# Patient Record
Sex: MALE | Race: OTHER | URBAN - METROPOLITAN AREA
[De-identification: names, ages, dates, MRNs, and addresses within clinical notes are randomized per-mention and may not be internally consistent; named-entity substitution may affect disease eponyms.]

---

## 2023-12-15 ENCOUNTER — APPOINTMENT (RX ONLY)
Dept: URBAN - METROPOLITAN AREA CLINIC 166 | Facility: CLINIC | Age: 53
Setting detail: DERMATOLOGY
End: 2023-12-15

## 2023-12-15 DIAGNOSIS — L57.0 ACTINIC KERATOSIS: ICD-10-CM

## 2023-12-15 DIAGNOSIS — D22 MELANOCYTIC NEVI: ICD-10-CM

## 2023-12-15 DIAGNOSIS — D18.0 HEMANGIOMA: ICD-10-CM

## 2023-12-15 DIAGNOSIS — L81.4 OTHER MELANIN HYPERPIGMENTATION: ICD-10-CM

## 2023-12-15 DIAGNOSIS — D49.2 NEOPLASM OF UNSPECIFIED BEHAVIOR OF BONE, SOFT TISSUE, AND SKIN: ICD-10-CM

## 2023-12-15 DIAGNOSIS — Z71.89 OTHER SPECIFIED COUNSELING: ICD-10-CM

## 2023-12-15 DIAGNOSIS — L57.8 OTHER SKIN CHANGES DUE TO CHRONIC EXPOSURE TO NONIONIZING RADIATION: ICD-10-CM

## 2023-12-15 DIAGNOSIS — L82.1 OTHER SEBORRHEIC KERATOSIS: ICD-10-CM

## 2023-12-15 PROBLEM — D18.01 HEMANGIOMA OF SKIN AND SUBCUTANEOUS TISSUE: Status: ACTIVE | Noted: 2023-12-15

## 2023-12-15 PROBLEM — D22.5 MELANOCYTIC NEVI OF TRUNK: Status: ACTIVE | Noted: 2023-12-15

## 2023-12-15 PROCEDURE — 11102 TANGNTL BX SKIN SINGLE LES: CPT

## 2023-12-15 PROCEDURE — ? LIQUID NITROGEN

## 2023-12-15 PROCEDURE — ? BIOPSY BY SHAVE METHOD

## 2023-12-15 PROCEDURE — ? COUNSELING

## 2023-12-15 PROCEDURE — 99213 OFFICE O/P EST LOW 20 MIN: CPT | Mod: 25

## 2023-12-15 PROCEDURE — ? SUNSCREEN RECOMMENDATIONS

## 2023-12-15 PROCEDURE — 17000 DESTRUCT PREMALG LESION: CPT | Mod: 59

## 2023-12-15 ASSESSMENT — LOCATION DETAILED DESCRIPTION DERM
LOCATION DETAILED: LEFT SUPERIOR HELIX
LOCATION DETAILED: SUPERIOR LUMBAR SPINE
LOCATION DETAILED: LEFT SUPERIOR UPPER BACK
LOCATION DETAILED: RIGHT POSTERIOR SHOULDER
LOCATION DETAILED: LEFT VENTRAL PROXIMAL FOREARM
LOCATION DETAILED: LEFT ANTERIOR SHOULDER
LOCATION DETAILED: LEFT SUPERIOR POSTERIOR HELIX
LOCATION DETAILED: RIGHT INFERIOR POSTERIOR HELIX
LOCATION DETAILED: LEFT PROXIMAL DORSAL FOREARM
LOCATION DETAILED: LEFT MEDIAL UPPER BACK
LOCATION DETAILED: LEFT CENTRAL MALAR CHEEK
LOCATION DETAILED: STERNUM
LOCATION DETAILED: LEFT DISTAL DORSAL FOREARM
LOCATION DETAILED: RIGHT VENTRAL PROXIMAL FOREARM

## 2023-12-15 ASSESSMENT — LOCATION SIMPLE DESCRIPTION DERM
LOCATION SIMPLE: RIGHT EAR
LOCATION SIMPLE: LOWER BACK
LOCATION SIMPLE: RIGHT FOREARM
LOCATION SIMPLE: LEFT EAR
LOCATION SIMPLE: LEFT SHOULDER
LOCATION SIMPLE: LEFT UPPER BACK
LOCATION SIMPLE: LEFT CHEEK
LOCATION SIMPLE: RIGHT SHOULDER
LOCATION SIMPLE: CHEST
LOCATION SIMPLE: LEFT FOREARM

## 2023-12-15 ASSESSMENT — LOCATION ZONE DERM
LOCATION ZONE: EAR
LOCATION ZONE: FACE
LOCATION ZONE: TRUNK
LOCATION ZONE: ARM

## 2023-12-15 NOTE — PROCEDURE: BIOPSY BY SHAVE METHOD
Body Location Override (Optional - Billing Will Still Be Based On Selected Body Map Location If Applicable): left dorsal forearm
Detail Level: Detailed
Depth Of Biopsy: dermis
Was A Bandage Applied: Yes
Size Of Lesion In Cm: 0
Biopsy Type: H and E
Biopsy Method: Personna blade
Anesthesia Type: 1% lidocaine with epinephrine
Anesthesia Volume In Cc: 0.5
Hemostasis: Aluminum Chloride
Wound Care: Petrolatum
Dressing: bandage
Destruction After The Procedure: No
Type Of Destruction Used: Curettage
Curettage Text: The wound bed was treated with curettage after the biopsy was performed.
Cryotherapy Text: The wound bed was treated with cryotherapy after the biopsy was performed.
Electrodesiccation Text: The wound bed was treated with electrodesiccation after the biopsy was performed.
Electrodesiccation And Curettage Text: The wound bed was treated with electrodesiccation and curettage after the biopsy was performed.
Silver Nitrate Text: The wound bed was treated with silver nitrate after the biopsy was performed.
Lab: [id number]
Consent: Written consent was obtained and risks were reviewed including but not limited to scarring, infection, bleeding, scabbing, incomplete removal, nerve damage and allergy to anesthesia.
Post-Care Instructions: I reviewed with the patient in detail post-care instructions. Keep area clean with soap and water. Apply vaseline and bandage until area is healed.
Notification Instructions: Patient will be notified of biopsy results. However, patient instructed to call the office if not contacted within 2 weeks.
Billing Type: Third-Party Bill
Information: Selecting Yes will display possible errors in your note based on the variables you have selected. This validation is only offered as a suggestion for you. PLEASE NOTE THAT THE VALIDATION TEXT WILL BE REMOVED WHEN YOU FINALIZE YOUR NOTE. IF YOU WANT TO FAX A PRELIMINARY NOTE YOU WILL NEED TO TOGGLE THIS TO 'NO' IF YOU DO NOT WANT IT IN YOUR FAXED NOTE.

## 2023-12-15 NOTE — PROCEDURE: LIQUID NITROGEN
Duration Of Freeze Thaw-Cycle (Seconds): 0
Consent: The patient's consent was obtained including but not limited to risks of crusting, scabbing, blistering, scarring, darker or lighter pigmentary change, recurrence, incomplete removal and infection.
Show Aperture Variable?: Yes
Render Note In Bullet Format When Appropriate: No
Number Of Freeze-Thaw Cycles: 2 freeze-thaw cycles
Post-Care Instructions: I reviewed with the patient in detail post-care instructions. Patient is to wear sunprotection, and avoid picking at any of the treated lesions. Pt may apply Vaseline to crusted or scabbing areas.
Detail Level: Detailed

## 2023-12-15 NOTE — PROCEDURE: COUNSELING
Detail Level: Zone
Detail Level: Generalized
Sunscreen Recommendations: CeraVe AM, Supergoop, Neutrogena Hydro Boost, Elta MD
Skin Checks Recommendations: Once per month for new lesions or changing nevi
Detail Level: Detailed
Sunscreen Recommendations: Neutrogena  Hydroboost, La Roche Posay, Superrasheeda or Elta MD
Detail Level: Simple
Sunscreen Recommendations: CeraVe AM, Supergoop, La Roche Posay or Elta MD

## 2024-05-03 ENCOUNTER — APPOINTMENT (RX ONLY)
Dept: URBAN - METROPOLITAN AREA CLINIC 166 | Facility: CLINIC | Age: 54
Setting detail: DERMATOLOGY
End: 2024-05-03

## 2024-05-03 DIAGNOSIS — L82.1 OTHER SEBORRHEIC KERATOSIS: ICD-10-CM

## 2024-05-03 DIAGNOSIS — L23.7 ALLERGIC CONTACT DERMATITIS DUE TO PLANTS, EXCEPT FOOD: ICD-10-CM

## 2024-05-03 DIAGNOSIS — D49.2 NEOPLASM OF UNSPECIFIED BEHAVIOR OF BONE, SOFT TISSUE, AND SKIN: ICD-10-CM

## 2024-05-03 DIAGNOSIS — D18.0 HEMANGIOMA: ICD-10-CM

## 2024-05-03 DIAGNOSIS — L57.8 OTHER SKIN CHANGES DUE TO CHRONIC EXPOSURE TO NONIONIZING RADIATION: ICD-10-CM

## 2024-05-03 DIAGNOSIS — D22 MELANOCYTIC NEVI: ICD-10-CM

## 2024-05-03 DIAGNOSIS — Z71.89 OTHER SPECIFIED COUNSELING: ICD-10-CM

## 2024-05-03 DIAGNOSIS — L81.4 OTHER MELANIN HYPERPIGMENTATION: ICD-10-CM

## 2024-05-03 PROBLEM — D22.5 MELANOCYTIC NEVI OF TRUNK: Status: ACTIVE | Noted: 2024-05-03

## 2024-05-03 PROBLEM — D18.01 HEMANGIOMA OF SKIN AND SUBCUTANEOUS TISSUE: Status: ACTIVE | Noted: 2024-05-03

## 2024-05-03 PROCEDURE — ? BIOPSY BY SHAVE METHOD

## 2024-05-03 PROCEDURE — ? SUNSCREEN RECOMMENDATIONS

## 2024-05-03 PROCEDURE — ? PRESCRIPTION MEDICATION MANAGEMENT

## 2024-05-03 PROCEDURE — ? PRESCRIPTION

## 2024-05-03 PROCEDURE — 11102 TANGNTL BX SKIN SINGLE LES: CPT

## 2024-05-03 PROCEDURE — 99213 OFFICE O/P EST LOW 20 MIN: CPT | Mod: 25

## 2024-05-03 PROCEDURE — ? COUNSELING

## 2024-05-03 RX ORDER — TRIAMCINOLONE ACETONIDE 1 MG/G
OINTMENT TOPICAL
Qty: 80 | Refills: 0 | Status: ERX | COMMUNITY
Start: 2024-05-03

## 2024-05-03 RX ADMIN — TRIAMCINOLONE ACETONIDE: 1 OINTMENT TOPICAL at 00:00

## 2024-05-03 ASSESSMENT — LOCATION SIMPLE DESCRIPTION DERM
LOCATION SIMPLE: RIGHT CHEEK
LOCATION SIMPLE: CHEST
LOCATION SIMPLE: RIGHT FOREARM
LOCATION SIMPLE: RIGHT POPLITEAL SKIN
LOCATION SIMPLE: LOWER BACK
LOCATION SIMPLE: LEFT PRETIBIAL REGION
LOCATION SIMPLE: RIGHT SHOULDER
LOCATION SIMPLE: LEFT UPPER BACK
LOCATION SIMPLE: LEFT POPLITEAL SKIN
LOCATION SIMPLE: RIGHT PRETIBIAL REGION
LOCATION SIMPLE: LEFT CHEEK
LOCATION SIMPLE: LEFT FOREARM
LOCATION SIMPLE: LEFT SHOULDER
LOCATION SIMPLE: LEFT ANTERIOR NECK

## 2024-05-03 ASSESSMENT — LOCATION DETAILED DESCRIPTION DERM
LOCATION DETAILED: RIGHT POPLITEAL SKIN
LOCATION DETAILED: RIGHT INFERIOR LATERAL MALAR CHEEK
LOCATION DETAILED: LEFT SUPERIOR LATERAL NECK
LOCATION DETAILED: LEFT ANTERIOR SHOULDER
LOCATION DETAILED: LEFT CENTRAL MALAR CHEEK
LOCATION DETAILED: LEFT POPLITEAL SKIN
LOCATION DETAILED: STERNUM
LOCATION DETAILED: RIGHT POSTERIOR SHOULDER
LOCATION DETAILED: LEFT MEDIAL UPPER BACK
LOCATION DETAILED: LEFT SUPERIOR UPPER BACK
LOCATION DETAILED: LEFT PROXIMAL DORSAL FOREARM
LOCATION DETAILED: SUPERIOR LUMBAR SPINE
LOCATION DETAILED: LEFT VENTRAL PROXIMAL FOREARM
LOCATION DETAILED: LEFT PROXIMAL PRETIBIAL REGION
LOCATION DETAILED: RIGHT PROXIMAL PRETIBIAL REGION
LOCATION DETAILED: RIGHT VENTRAL PROXIMAL FOREARM

## 2024-05-03 ASSESSMENT — LOCATION ZONE DERM
LOCATION ZONE: NECK
LOCATION ZONE: TRUNK
LOCATION ZONE: ARM
LOCATION ZONE: LEG
LOCATION ZONE: FACE

## 2024-05-03 NOTE — PROCEDURE: COUNSELING
Detail Level: Zone
Detail Level: Generalized
Sunscreen Recommendations: CeraVe AM, Supergoop, Neutrogena Hydro Boost, Elta MD
Skin Checks Recommendations: Once per month for new lesions or changing nevi
Detail Level: Detailed
Detail Level: Simple

## 2024-05-03 NOTE — PROCEDURE: BIOPSY BY SHAVE METHOD
Body Location Override (Optional - Billing Will Still Be Based On Selected Body Map Location If Applicable): A - left neck
Detail Level: Detailed
Depth Of Biopsy: dermis
Was A Bandage Applied: Yes
Size Of Lesion In Cm: 0
Biopsy Type: H and E
Biopsy Method: Personna blade
Anesthesia Type: 1% lidocaine with epinephrine
Anesthesia Volume In Cc: 0.5
Hemostasis: Aluminum Chloride
Wound Care: Petrolatum
Dressing: bandage
Destruction After The Procedure: No
Type Of Destruction Used: Curettage
Curettage Text: The wound bed was treated with curettage after the biopsy was performed.
Cryotherapy Text: The wound bed was treated with cryotherapy after the biopsy was performed.
Electrodesiccation Text: The wound bed was treated with electrodesiccation after the biopsy was performed.
Electrodesiccation And Curettage Text: The wound bed was treated with electrodesiccation and curettage after the biopsy was performed.
Silver Nitrate Text: The wound bed was treated with silver nitrate after the biopsy was performed.
Lab: [id number]
Consent: Written consent was obtained and risks were reviewed including but not limited to scarring, infection, bleeding, scabbing, incomplete removal, nerve damage and allergy to anesthesia.
Post-Care Instructions: I reviewed with the patient in detail post-care instructions. Keep area clean with soap and water. Apply vaseline and bandage until area is healed.
Notification Instructions: Patient will be notified of biopsy results. However, patient instructed to call the office if not contacted within 2 weeks.
Billing Type: Third-Party Bill
Information: Selecting Yes will display possible errors in your note based on the variables you have selected. This validation is only offered as a suggestion for you. PLEASE NOTE THAT THE VALIDATION TEXT WILL BE REMOVED WHEN YOU FINALIZE YOUR NOTE. IF YOU WANT TO FAX A PRELIMINARY NOTE YOU WILL NEED TO TOGGLE THIS TO 'NO' IF YOU DO NOT WANT IT IN YOUR FAXED NOTE.

## 2024-05-03 NOTE — PROCEDURE: PRESCRIPTION MEDICATION MANAGEMENT
Initiate Treatment: Triamcinolone - apply BID x 14 days then PRN when flared
Render In Strict Bullet Format?: No
Detail Level: Zone

## 2024-11-12 ENCOUNTER — APPOINTMENT (RX ONLY)
Dept: URBAN - METROPOLITAN AREA CLINIC 166 | Facility: CLINIC | Age: 54
Setting detail: DERMATOLOGY
End: 2024-11-12

## 2024-11-12 DIAGNOSIS — L82.1 OTHER SEBORRHEIC KERATOSIS: ICD-10-CM

## 2024-11-12 DIAGNOSIS — D22 MELANOCYTIC NEVI: ICD-10-CM

## 2024-11-12 DIAGNOSIS — L82.0 INFLAMED SEBORRHEIC KERATOSIS: ICD-10-CM

## 2024-11-12 DIAGNOSIS — D18.0 HEMANGIOMA: ICD-10-CM

## 2024-11-12 DIAGNOSIS — Z71.89 OTHER SPECIFIED COUNSELING: ICD-10-CM

## 2024-11-12 DIAGNOSIS — L57.8 OTHER SKIN CHANGES DUE TO CHRONIC EXPOSURE TO NONIONIZING RADIATION: ICD-10-CM

## 2024-11-12 DIAGNOSIS — L81.4 OTHER MELANIN HYPERPIGMENTATION: ICD-10-CM

## 2024-11-12 PROBLEM — D18.01 HEMANGIOMA OF SKIN AND SUBCUTANEOUS TISSUE: Status: ACTIVE | Noted: 2024-11-12

## 2024-11-12 PROBLEM — D22.5 MELANOCYTIC NEVI OF TRUNK: Status: ACTIVE | Noted: 2024-11-12

## 2024-11-12 PROCEDURE — 17110 DESTRUCTION B9 LES UP TO 14: CPT

## 2024-11-12 PROCEDURE — ? COUNSELING

## 2024-11-12 PROCEDURE — ? SUNSCREEN RECOMMENDATIONS

## 2024-11-12 PROCEDURE — 99213 OFFICE O/P EST LOW 20 MIN: CPT | Mod: 25

## 2024-11-12 PROCEDURE — ? LIQUID NITROGEN

## 2024-11-12 ASSESSMENT — LOCATION DETAILED DESCRIPTION DERM
LOCATION DETAILED: STERNUM
LOCATION DETAILED: LEFT ANTERIOR SHOULDER
LOCATION DETAILED: LEFT INFERIOR CENTRAL MALAR CHEEK
LOCATION DETAILED: RIGHT POSTERIOR SHOULDER
LOCATION DETAILED: LEFT SUPERIOR UPPER BACK
LOCATION DETAILED: RIGHT INFERIOR LATERAL UPPER BACK
LOCATION DETAILED: SUPERIOR LUMBAR SPINE
LOCATION DETAILED: LEFT LATERAL MALAR CHEEK
LOCATION DETAILED: RIGHT VENTRAL PROXIMAL FOREARM
LOCATION DETAILED: LEFT CENTRAL MALAR CHEEK
LOCATION DETAILED: LEFT MEDIAL UPPER BACK
LOCATION DETAILED: RIGHT LATERAL TRAPEZIAL NECK
LOCATION DETAILED: RIGHT LATERAL INFERIOR CHEST
LOCATION DETAILED: RIGHT ANTERIOR SHOULDER
LOCATION DETAILED: LEFT SUPERIOR MEDIAL LOWER BACK
LOCATION DETAILED: LEFT INFERIOR UPPER BACK
LOCATION DETAILED: RIGHT SUPERIOR LATERAL UPPER BACK
LOCATION DETAILED: RIGHT SUPERIOR POSTERIOR NECK
LOCATION DETAILED: LEFT PROXIMAL DORSAL FOREARM
LOCATION DETAILED: LEFT VENTRAL PROXIMAL FOREARM
LOCATION DETAILED: RIGHT SUPERIOR LATERAL NECK

## 2024-11-12 ASSESSMENT — LOCATION SIMPLE DESCRIPTION DERM
LOCATION SIMPLE: POSTERIOR NECK
LOCATION SIMPLE: RIGHT FOREARM
LOCATION SIMPLE: LEFT CHEEK
LOCATION SIMPLE: LEFT LOWER BACK
LOCATION SIMPLE: LEFT FOREARM
LOCATION SIMPLE: CHEST
LOCATION SIMPLE: LEFT UPPER BACK
LOCATION SIMPLE: RIGHT UPPER BACK
LOCATION SIMPLE: LEFT SHOULDER
LOCATION SIMPLE: RIGHT SHOULDER
LOCATION SIMPLE: LOWER BACK
LOCATION SIMPLE: NECK

## 2024-11-12 ASSESSMENT — LOCATION ZONE DERM
LOCATION ZONE: FACE
LOCATION ZONE: ARM
LOCATION ZONE: NECK
LOCATION ZONE: TRUNK

## 2024-11-12 NOTE — PROCEDURE: LIQUID NITROGEN
Render Note In Bullet Format When Appropriate: No
Spray Paint Text: The liquid nitrogen was applied to the skin utilizing a spray paint frosting technique.
Number Of Freeze-Thaw Cycles: 2 freeze-thaw cycles
Consent: The patient's consent was obtained including but not limited to risks of crusting, scabbing, blistering, scarring, darker or lighter pigmentary change, recurrence, incomplete removal and infection.
Medical Necessity Clause: This procedure was medically necessary because the lesions that were treated were:
Show Topical Anesthesia Variable?: Yes
Detail Level: Detailed
Medical Necessity Information: It is in your best interest to select a reason for this procedure from the list below. All of these items fulfill various CMS LCD requirements except the new and changing color options.
Post-Care Instructions: I reviewed with the patient in detail post-care instructions. Patient is to wear sunprotection, and avoid picking at any of the treated lesions. Pt may apply Vaseline to crusted or scabbing areas.
Duration Of Freeze Thaw-Cycle (Seconds): 10-15

## 2024-11-12 NOTE — HPI: SECONDARY COMPLAINT
How Severe Is This Condition?: moderate
Additional History: The post to the neck is a new mole that itch. The lesion  to the left check is a new spot want checked.

## 2025-05-13 ENCOUNTER — APPOINTMENT (OUTPATIENT)
Dept: URBAN - METROPOLITAN AREA CLINIC 166 | Facility: CLINIC | Age: 55
Setting detail: DERMATOLOGY
End: 2025-05-13

## 2025-05-13 DIAGNOSIS — L82.1 OTHER SEBORRHEIC KERATOSIS: ICD-10-CM

## 2025-05-13 DIAGNOSIS — L81.4 OTHER MELANIN HYPERPIGMENTATION: ICD-10-CM

## 2025-05-13 DIAGNOSIS — D18.0 HEMANGIOMA: ICD-10-CM

## 2025-05-13 DIAGNOSIS — L57.8 OTHER SKIN CHANGES DUE TO CHRONIC EXPOSURE TO NONIONIZING RADIATION: ICD-10-CM

## 2025-05-13 DIAGNOSIS — D22 MELANOCYTIC NEVI: ICD-10-CM

## 2025-05-13 DIAGNOSIS — Z71.89 OTHER SPECIFIED COUNSELING: ICD-10-CM

## 2025-05-13 PROBLEM — D18.01 HEMANGIOMA OF SKIN AND SUBCUTANEOUS TISSUE: Status: ACTIVE | Noted: 2025-05-13

## 2025-05-13 PROBLEM — D22.5 MELANOCYTIC NEVI OF TRUNK: Status: ACTIVE | Noted: 2025-05-13

## 2025-05-13 PROCEDURE — 99213 OFFICE O/P EST LOW 20 MIN: CPT

## 2025-05-13 PROCEDURE — ? COUNSELING

## 2025-05-13 PROCEDURE — ? SUNSCREEN RECOMMENDATIONS

## 2025-05-13 ASSESSMENT — LOCATION DETAILED DESCRIPTION DERM
LOCATION DETAILED: LEFT MEDIAL UPPER BACK
LOCATION DETAILED: RIGHT POSTERIOR SHOULDER
LOCATION DETAILED: SUPERIOR LUMBAR SPINE
LOCATION DETAILED: LEFT ANTERIOR SHOULDER
LOCATION DETAILED: LEFT VENTRAL PROXIMAL FOREARM
LOCATION DETAILED: LEFT SUPERIOR UPPER BACK
LOCATION DETAILED: STERNUM
LOCATION DETAILED: LEFT CENTRAL MALAR CHEEK
LOCATION DETAILED: RIGHT VENTRAL PROXIMAL FOREARM
LOCATION DETAILED: LEFT PROXIMAL DORSAL FOREARM

## 2025-05-13 ASSESSMENT — LOCATION SIMPLE DESCRIPTION DERM
LOCATION SIMPLE: RIGHT FOREARM
LOCATION SIMPLE: RIGHT SHOULDER
LOCATION SIMPLE: LOWER BACK
LOCATION SIMPLE: LEFT FOREARM
LOCATION SIMPLE: CHEST
LOCATION SIMPLE: LEFT UPPER BACK
LOCATION SIMPLE: LEFT CHEEK
LOCATION SIMPLE: LEFT SHOULDER

## 2025-05-13 ASSESSMENT — LOCATION ZONE DERM
LOCATION ZONE: FACE
LOCATION ZONE: TRUNK
LOCATION ZONE: ARM